# Patient Record
Sex: MALE | Race: BLACK OR AFRICAN AMERICAN | NOT HISPANIC OR LATINO | Employment: FULL TIME | URBAN - METROPOLITAN AREA
[De-identification: names, ages, dates, MRNs, and addresses within clinical notes are randomized per-mention and may not be internally consistent; named-entity substitution may affect disease eponyms.]

---

## 2019-02-24 ENCOUNTER — HOSPITAL ENCOUNTER (EMERGENCY)
Facility: CLINIC | Age: 34
Discharge: HOME | End: 2019-02-25
Attending: EMERGENCY MEDICINE | Admitting: EMERGENCY MEDICINE

## 2019-02-24 DIAGNOSIS — G44.40 DRUG-INDUCED HEADACHE, NOT ELSEWHERE CLASSIFIED, NOT INTRACTABLE: ICD-10-CM

## 2019-02-24 PROCEDURE — 99285 EMERGENCY DEPT VISIT HI MDM: CPT | Mod: Z6 | Performed by: EMERGENCY MEDICINE

## 2019-02-24 PROCEDURE — 99285 EMERGENCY DEPT VISIT HI MDM: CPT | Mod: 25 | Performed by: EMERGENCY MEDICINE

## 2019-02-24 ASSESSMENT — MIFFLIN-ST. JEOR: SCORE: 1813.96

## 2019-02-24 NOTE — ED AVS SNAPSHOT
[facility], Emergency Department  [address]  [facility], [place] [address]  Phone:  [phone]  Fax:  [phone]                                    Jenaro Santos   MRN: [id number]    Department:  [facility], Emergency Department   Date of Visit:  2/24/2019           After Visit Summary Signature Page    I have received my discharge instructions, and my questions have been answered. I have discussed any challenges I see with this plan with the nurse or doctor.    ..........................................................................................................................................  Patient/Patient Representative Signature      ..........................................................................................................................................  Patient Representative Print Name and Relationship to Patient    ..................................................               ................................................  Date                                   Time    ..........................................................................................................................................  Reviewed by Signature/Title    ...................................................              ..............................................  Date                                               Time          22EPIC Rev 08/18

## 2019-02-25 ENCOUNTER — APPOINTMENT (OUTPATIENT)
Dept: CT IMAGING | Facility: CLINIC | Age: 34
End: 2019-02-25
Attending: EMERGENCY MEDICINE

## 2019-02-25 VITALS
RESPIRATION RATE: 16 BRPM | DIASTOLIC BLOOD PRESSURE: 64 MMHG | HEIGHT: 73 IN | WEIGHT: 183 LBS | HEART RATE: 76 BPM | BODY MASS INDEX: 24.25 KG/M2 | TEMPERATURE: 98.1 F | SYSTOLIC BLOOD PRESSURE: 125 MMHG | OXYGEN SATURATION: 98 %

## 2019-02-25 LAB
ANION GAP SERPL CALCULATED.3IONS-SCNC: 4 MMOL/L (ref 3–14)
BASOPHILS # BLD AUTO: 0 10E9/L (ref 0–0.2)
BASOPHILS NFR BLD AUTO: 0.5 %
BUN SERPL-MCNC: 12 MG/DL (ref 7–30)
CALCIUM SERPL-MCNC: 8.5 MG/DL (ref 8.5–10.1)
CHLORIDE SERPL-SCNC: 113 MMOL/L (ref 94–109)
CO2 SERPL-SCNC: 27 MMOL/L (ref 20–32)
CREAT SERPL-MCNC: 0.97 MG/DL (ref 0.66–1.25)
DIFFERENTIAL METHOD BLD: NORMAL
EOSINOPHIL # BLD AUTO: 0.1 10E9/L (ref 0–0.7)
EOSINOPHIL NFR BLD AUTO: 1.9 %
ERYTHROCYTE [DISTWIDTH] IN BLOOD BY AUTOMATED COUNT: 12.2 % (ref 10–15)
GFR SERPL CREATININE-BSD FRML MDRD: >90 ML/MIN/{1.73_M2}
GLUCOSE SERPL-MCNC: 96 MG/DL (ref 70–99)
HCT VFR BLD AUTO: 42.9 % (ref 40–53)
HGB BLD-MCNC: 14.9 G/DL (ref 13.3–17.7)
IMM GRANULOCYTES # BLD: 0 10E9/L (ref 0–0.4)
IMM GRANULOCYTES NFR BLD: 0.3 %
INTERPRETATION ECG - MUSE: NORMAL
LYMPHOCYTES # BLD AUTO: 2.6 10E9/L (ref 0.8–5.3)
LYMPHOCYTES NFR BLD AUTO: 42 %
MCH RBC QN AUTO: 31.1 PG (ref 26.5–33)
MCHC RBC AUTO-ENTMCNC: 34.7 G/DL (ref 31.5–36.5)
MCV RBC AUTO: 90 FL (ref 78–100)
MONOCYTES # BLD AUTO: 0.5 10E9/L (ref 0–1.3)
MONOCYTES NFR BLD AUTO: 7.3 %
NEUTROPHILS # BLD AUTO: 3 10E9/L (ref 1.6–8.3)
NEUTROPHILS NFR BLD AUTO: 48 %
NRBC # BLD AUTO: 0 10*3/UL
NRBC BLD AUTO-RTO: 0 /100
PLATELET # BLD AUTO: 236 10E9/L (ref 150–450)
POTASSIUM SERPL-SCNC: 4 MMOL/L (ref 3.4–5.3)
RBC # BLD AUTO: 4.79 10E12/L (ref 4.4–5.9)
SODIUM SERPL-SCNC: 144 MMOL/L (ref 133–144)
WBC # BLD AUTO: 6.2 10E9/L (ref 4–11)

## 2019-02-25 PROCEDURE — 96375 TX/PRO/DX INJ NEW DRUG ADDON: CPT | Performed by: EMERGENCY MEDICINE

## 2019-02-25 PROCEDURE — 96374 THER/PROPH/DIAG INJ IV PUSH: CPT | Performed by: EMERGENCY MEDICINE

## 2019-02-25 PROCEDURE — 25000125 ZZHC RX 250: Performed by: EMERGENCY MEDICINE

## 2019-02-25 PROCEDURE — 25000128 H RX IP 250 OP 636: Performed by: EMERGENCY MEDICINE

## 2019-02-25 PROCEDURE — 93005 ELECTROCARDIOGRAM TRACING: CPT | Performed by: EMERGENCY MEDICINE

## 2019-02-25 PROCEDURE — 80048 BASIC METABOLIC PNL TOTAL CA: CPT | Performed by: EMERGENCY MEDICINE

## 2019-02-25 PROCEDURE — 85025 COMPLETE CBC W/AUTO DIFF WBC: CPT | Performed by: EMERGENCY MEDICINE

## 2019-02-25 PROCEDURE — 70450 CT HEAD/BRAIN W/O DYE: CPT

## 2019-02-25 PROCEDURE — 96361 HYDRATE IV INFUSION ADD-ON: CPT | Performed by: EMERGENCY MEDICINE

## 2019-02-25 PROCEDURE — 25000132 ZZH RX MED GY IP 250 OP 250 PS 637: Performed by: EMERGENCY MEDICINE

## 2019-02-25 RX ORDER — SODIUM CHLORIDE 9 MG/ML
1000 INJECTION, SOLUTION INTRAVENOUS CONTINUOUS
Status: DISCONTINUED | OUTPATIENT
Start: 2019-02-25 | End: 2019-02-25 | Stop reason: HOSPADM

## 2019-02-25 RX ORDER — KETOROLAC TROMETHAMINE 30 MG/ML
30 INJECTION, SOLUTION INTRAMUSCULAR; INTRAVENOUS ONCE
Status: COMPLETED | OUTPATIENT
Start: 2019-02-25 | End: 2019-02-25

## 2019-02-25 RX ORDER — LIDOCAINE 40 MG/G
CREAM TOPICAL
Status: DISCONTINUED | OUTPATIENT
Start: 2019-02-25 | End: 2019-02-25 | Stop reason: HOSPADM

## 2019-02-25 RX ORDER — ONDANSETRON 2 MG/ML
4 INJECTION INTRAMUSCULAR; INTRAVENOUS EVERY 30 MIN PRN
Status: DISCONTINUED | OUTPATIENT
Start: 2019-02-25 | End: 2019-02-25 | Stop reason: HOSPADM

## 2019-02-25 RX ADMIN — KETOROLAC TROMETHAMINE 30 MG: 30 INJECTION, SOLUTION INTRAMUSCULAR; INTRAVENOUS at 01:08

## 2019-02-25 RX ADMIN — LIDOCAINE HYDROCHLORIDE 30 ML: 20 SOLUTION ORAL; TOPICAL at 01:38

## 2019-02-25 RX ADMIN — SODIUM CHLORIDE 500 ML: 9 INJECTION, SOLUTION INTRAVENOUS at 01:08

## 2019-02-25 RX ADMIN — ONDANSETRON 4 MG: 2 INJECTION INTRAMUSCULAR; INTRAVENOUS at 01:08

## 2019-02-25 SDOH — HEALTH STABILITY: MENTAL HEALTH: HOW OFTEN DO YOU HAVE A DRINK CONTAINING ALCOHOL?: NEVER

## 2019-02-25 ASSESSMENT — ENCOUNTER SYMPTOMS
SLEEP DISTURBANCE: 0
RHINORRHEA: 0
COUGH: 0
SINUS PAIN: 0
DIAPHORESIS: 0
TROUBLE SWALLOWING: 0
SORE THROAT: 0
ARTHRALGIAS: 0
VOMITING: 0
WEAKNESS: 0
SEIZURES: 0
NERVOUS/ANXIOUS: 1
EYE REDNESS: 0
HEADACHES: 1
FATIGUE: 0
TREMORS: 0
COLOR CHANGE: 0
WOUND: 0
SPEECH DIFFICULTY: 0
BRUISES/BLEEDS EASILY: 0
FACIAL SWELLING: 0
DIZZINESS: 0
FEVER: 0
CHEST TIGHTNESS: 0
NECK STIFFNESS: 0
NECK PAIN: 1
CONFUSION: 0
FACIAL ASYMMETRY: 0
NAUSEA: 1
CHILLS: 0
SINUS PRESSURE: 0
LIGHT-HEADEDNESS: 0
NUMBNESS: 0
BLOOD IN STOOL: 0
DIFFICULTY URINATING: 0
PALPITATIONS: 0
CONSTITUTIONAL NEGATIVE: 1
APPETITE CHANGE: 0
SHORTNESS OF BREATH: 0
ABDOMINAL PAIN: 0

## 2019-02-25 NOTE — ED NOTES
Spoke with Cassi at poison control. Update provided. Closing pt's case at this time. Staff to call back if any new concerns arise.

## 2019-02-25 NOTE — ED TRIAGE NOTES
"Pt ingested a male enhancement supplement called \"Kingdom Honey\"  he bought OTC.  Pt stated he does not take any recreational drugs.  Pt reports having a headache that is different than usual and a stiff neck.  Pt searched this supplement on Internet and learned the FDA has a warning against use.  "

## 2019-02-25 NOTE — ED NOTES
"Per physician's request laboratory services called in regards to the patient's lab results from blood which was drawn at 0100. The tests show \"in process.\" When speaking with the staff at the main lab I was informed there is only one person working there and ER specimens are processed in the satellite lab. Upon receiving their number I spoke with the staff in the satellite whom stated they did not have the patient's blood work. Stated they would call the main lab as they are responsible for receiving and making the labs \"in process.\" The satellite lab staff stated, \"I will call you back if there are any problems.\"   "

## 2019-02-25 NOTE — ED NOTES
Followed up with lab regarding missing blood work. Called satellite lab. Stated they were waiting to hear back from main lab. The main lab was contacted. States they are still looking for the blood. Writer told them due to the excessively prolonged delay that she would be sending new specimens. New blood sent to lab.

## 2019-02-25 NOTE — DISCHARGE INSTRUCTIONS
"Avoid use of \"Kingdom Honey\". This substance contains tadalafil (Cialis) and can cause headaches and other side effects.  Follow up with your primary care clinic provider.  Return if persistent symptoms.  Use antacids if needed.  Take Tylenol if needed for pain.  "

## 2019-02-25 NOTE — ED PROVIDER NOTES
"                                                                                                                                                                History     Chief Complaint   Patient presents with     Headache     Pt took OTC male enhancement product last night and he found out the FDA has warning regarding this supplement.     Neck Pain     The history is provided by the patient and medical records.     Jenaor Santos is a 34 year old male who presents to the Emergency Department for evaluation of a headache and neck pain. Patient, who is a , stopped at a store to get oil and while there, saw a man grab a product called \"kingdom honey\". He asked what it was and the man told him that it was good for sexual pleasure. Patient purchased it and took 2 doses of it. He reports that he developed a headache after taking the doses. He checked the labeling and it notes that is has Viagra and Cialis in it. Today, he reports that he took half a dose of \"kingdom honey\" because he liked the feeling of it but continued to have a headache. He also complains of constant heartburn and blurry vision. In addition, he notes of neck pain that worsens when turning his head laterally. He denies a fever. He denies trouble walking or balancing. He denies trouble swallowing. He denies a cough or shortness of breath; however, reports that after having sexual intercourse last night, felt shortness of breath. He reports that he has never taken drugs before. He denies a history of headaches or migraines. He denies a history of HTN or shortness of breath.   No history of trauma. No confusion or other mental status changes. No other known toxin exposure including no known exposure to carbon monoxide. No history of vascular headache. No personal or family history of aneurysm. No chest pain but reports history of GERD and has \"heartburn\" now. No neck stiffness. No fever or chills or other systemic symptoms. No history of " neuralgia or arteritis or glaucoma.  History reviewed. No pertinent past medical history.    History reviewed. No pertinent surgical history.    History reviewed. No pertinent family history.    Social History     Tobacco Use     Smoking status: Current Every Day Smoker     Packs/day: 1.00     Years: 15.00     Pack years: 15.00     Smokeless tobacco: Never Used   Substance Use Topics     Alcohol use: No     Frequency: Never       Current Facility-Administered Medications   Medication     lidocaine (LMX4) cream     lidocaine 1 % 0.1-1 mL     ondansetron (ZOFRAN) injection 4 mg     sodium chloride (PF) 0.9% PF flush 3 mL     sodium chloride (PF) 0.9% PF flush 3 mL     sodium chloride 0.9% infusion     No current outpatient medications on file.      No Known Allergies    I have reviewed the Medications, Allergies, Past Medical and Surgical History, and Social History in the Epic system.    Review of Systems   Constitutional: Negative.  Negative for appetite change, chills, diaphoresis, fatigue and fever.        Negative for trouble walking or balancing   HENT: Negative for congestion, dental problem, ear pain, facial swelling, mouth sores, rhinorrhea, sinus pressure, sinus pain, sore throat and trouble swallowing.    Eyes: Negative for redness and visual disturbance.        Positive for blurry vision   Respiratory: Negative for cough, chest tightness and shortness of breath.    Cardiovascular: Negative for chest pain, palpitations and leg swelling.        Positive for heartburn   Gastrointestinal: Positive for nausea. Negative for abdominal pain, blood in stool and vomiting.   Genitourinary: Negative for difficulty urinating.   Musculoskeletal: Positive for neck pain (When turning head). Negative for arthralgias, gait problem and neck stiffness.   Skin: Negative for color change, rash and wound.   Allergic/Immunologic: Negative for immunocompromised state.   Neurological: Positive for headaches. Negative for dizziness,  "tremors, seizures, syncope, facial asymmetry, speech difficulty, weakness, light-headedness and numbness.   Hematological: Does not bruise/bleed easily.   Psychiatric/Behavioral: Negative for confusion and sleep disturbance. The patient is nervous/anxious.    All other systems reviewed and are negative.      Physical Exam   BP: 110/80  Pulse: 74  Temp: 98.1  F (36.7  C)  Resp: 16  Height: 185.4 cm (6' 1\")  Weight: 83 kg (183 lb)  SpO2: 99 %      Physical Exam   Constitutional: He is oriented to person, place, and time. He appears well-developed and well-nourished. No distress.   HENT:   Head: Normocephalic and atraumatic.   Nose: Nose normal. Right sinus exhibits no maxillary sinus tenderness and no frontal sinus tenderness. Left sinus exhibits no maxillary sinus tenderness and no frontal sinus tenderness.   Mouth/Throat: Uvula is midline, oropharynx is clear and moist and mucous membranes are normal. No oral lesions. No uvula swelling.   No head tenderness. No temporal artery tenderness.   Eyes: Pupils are equal, round, and reactive to light. No scleral icterus.   Neck: Normal range of motion. Neck supple.   No nuchal rigidity. No meningeal signs.   Cardiovascular: Normal heart sounds and intact distal pulses.   Pulmonary/Chest: Breath sounds normal. No respiratory distress.   Abdominal: Soft. Bowel sounds are normal. There is no tenderness.   Musculoskeletal: He exhibits no edema or tenderness.   Neurological: He is alert and oriented to person, place, and time. He has normal strength and normal reflexes. No cranial nerve deficit or sensory deficit. Coordination and gait normal. He displays no Babinski's sign on the right side. He displays no Babinski's sign on the left side.   Skin: Skin is warm. No rash noted. He is not diaphoretic.   Psychiatric: He has a normal mood and affect. His speech is normal and behavior is normal. Cognition and memory are normal.   Nursing note and vitals reviewed.      ED Course "   12:29 AM  The patient was seen and examined by Coy Scruggs MD in Room ED04.        Procedures             Critical Care time:  none             Labs Ordered and Resulted from Time of ED Arrival Up to the Time of Departure from the ED   BASIC METABOLIC PANEL - Abnormal; Notable for the following components:       Result Value    Chloride 113 (*)     All other components within normal limits   CBC WITH PLATELETS DIFFERENTIAL   PERIPHERAL IV CATHETER     Medications   lidocaine 1 % 0.1-1 mL (not administered)   lidocaine (LMX4) cream (not administered)   sodium chloride (PF) 0.9% PF flush 3 mL (not administered)   sodium chloride (PF) 0.9% PF flush 3 mL (0 mLs Intracatheter Hold 2/25/19 0152)   0.9% sodium chloride BOLUS (0 mLs Intravenous Stopped 2/25/19 0150)     Followed by   sodium chloride 0.9% infusion (0 mLs Intravenous Stopped 2/25/19 0406)   ondansetron (ZOFRAN) injection 4 mg (4 mg Intravenous Given 2/25/19 0108)   ketorolac (TORADOL) injection 30 mg (30 mg Intravenous Given 2/25/19 0108)   lidocaine VISCOUS (XYLOCAINE) 2 % 15 mL, alum & mag hydroxide-simethicone (MYLANTA ES/MAALOX  ES) 15 mL GI Cocktail (30 mLs Oral Given 2/25/19 0138)          Assessments & Plan (with Medical Decision Making)   Discussed with poison center. Up to 42% of patients reported to have headaches after taking this substance. Instructed him to avoid use in future. He is much improved with ketorolac and ondansetron and GI cocktail. No evidence of intracranial hemorrhage or acute cardiovascular event. No evidence of infection and no report of trauma. No other history of exposure to toxins. No findings suggestive of meningitis, arteritis, neuralgia, glaucoma or other causes of headache. Follow up with primary care provider and instructed to return if persistent symptoms.     I have reviewed the nursing notes.    I have reviewed the findings, diagnosis, plan and need for follow up with the patient.       Medication List       There are no discharge medications for this visit.         Final diagnoses:   Drug-induced headache, not elsewhere classified, not intractable     I, Clari Suh, am serving as a trained medical scribe to document services personally performed by Coy Scruggs MD, based on the provider's statements to me.      Coy AMADOR MD, was physically present and have reviewed and verified the accuracy of this note documented by Clari Suh.     2/24/2019   [facility], [place], EMERGENCY DEPARTMENT     Coy Scruggs MD  02/25/19 0424

## 2019-09-24 ENCOUNTER — OFFICE (OUTPATIENT)
Dept: URBAN - METROPOLITAN AREA CLINIC 100 | Facility: CLINIC | Age: 34
Setting detail: OPHTHALMOLOGY
End: 2019-09-24
Payer: MEDICAID

## 2019-09-24 DIAGNOSIS — S05.02XA: ICD-10-CM

## 2019-09-24 DIAGNOSIS — T15.01XA: ICD-10-CM

## 2019-09-24 PROCEDURE — 92002 INTRM OPH EXAM NEW PATIENT: CPT | Performed by: OPTOMETRIST

## 2019-09-24 PROCEDURE — 65222 REMOVE FOREIGN BODY FROM EYE: CPT | Performed by: OPTOMETRIST

## 2019-09-24 ASSESSMENT — REFRACTION_CURRENTRX
OD_OVR_VA: 20/
OS_OVR_VA: 20/
OD_OVR_VA: 20/
OS_OVR_VA: 20/
OS_OVR_VA: 20/
OD_OVR_VA: 20/

## 2019-09-24 ASSESSMENT — VISUAL ACUITY
OS_BCVA: 20/30-2
OD_BCVA: 20/30-2

## 2019-09-24 ASSESSMENT — REFRACTION_MANIFEST
OU_VA: 20/
OU_VA: 20/
OS_VA2: 20/
OS_VA3: 20/
OS_VA1: 20/
OD_VA1: 20/
OD_VA3: 20/
OD_VA1: 20/
OS_VA2: 20/
OS_VA3: 20/
OD_VA2: 20/
OD_VA2: 20/
OS_VA1: 20/
OD_VA3: 20/

## 2019-09-24 ASSESSMENT — CONFRONTATIONAL VISUAL FIELD TEST (CVF)
OD_FINDINGS: FULL
OS_FINDINGS: FULL

## 2019-09-24 ASSESSMENT — CORNEAL TRAUMA - ABRASION: OD_ABRASION: PRESENT

## 2019-09-24 ASSESSMENT — CORNEAL TRAUMA - FOREIGN BODY: OD_FOREIGNBODY: METALLIC W/RUST RUN PRESENT

## 2022-12-03 ENCOUNTER — HOSPITAL ENCOUNTER (EMERGENCY)
Facility: CLINIC | Age: 37
Discharge: HOME | End: 2022-12-03
Attending: EMERGENCY MEDICINE | Admitting: EMERGENCY MEDICINE

## 2022-12-03 VITALS
OXYGEN SATURATION: 99 % | HEIGHT: 73 IN | WEIGHT: 187 LBS | HEART RATE: 87 BPM | DIASTOLIC BLOOD PRESSURE: 74 MMHG | BODY MASS INDEX: 24.78 KG/M2 | TEMPERATURE: 98.3 F | RESPIRATION RATE: 18 BRPM | SYSTOLIC BLOOD PRESSURE: 133 MMHG

## 2022-12-03 DIAGNOSIS — L05.01 PILONIDAL ABSCESS: ICD-10-CM

## 2022-12-03 PROCEDURE — 99283 EMERGENCY DEPT VISIT LOW MDM: CPT | Mod: 25

## 2022-12-03 PROCEDURE — 10080 I&D PILONIDAL CYST SIMPLE: CPT

## 2022-12-03 PROCEDURE — 87077 CULTURE AEROBIC IDENTIFY: CPT | Performed by: EMERGENCY MEDICINE

## 2022-12-03 PROCEDURE — 87070 CULTURE OTHR SPECIMN AEROBIC: CPT | Performed by: EMERGENCY MEDICINE

## 2022-12-03 RX ORDER — CEPHALEXIN 500 MG/1
500 CAPSULE ORAL 3 TIMES DAILY
Qty: 21 CAPSULE | Refills: 0 | Status: SHIPPED | OUTPATIENT
Start: 2022-12-03 | End: 2022-12-10

## 2022-12-03 RX ORDER — LIDOCAINE HYDROCHLORIDE AND EPINEPHRINE 10; 10 MG/ML; UG/ML
INJECTION, SOLUTION INFILTRATION; PERINEURAL
Status: DISCONTINUED
Start: 2022-12-03 | End: 2022-12-03 | Stop reason: HOSPADM

## 2022-12-03 ASSESSMENT — ENCOUNTER SYMPTOMS
FEVER: 0
MYALGIAS: 1

## 2022-12-03 NOTE — ED PROVIDER NOTES
"  History   Chief Complaint:  Wound Check       The history is provided by the patient.      Jenaro Santos is a 37 year old otherwise healthy male who presents for wound check. Patient reports that he developed, what seems like, an abscess on his buttocks 3 days ago. He endorses pain, sensitivity and body aches. He denies fever. He states this is the fist time something like this has happened. He reports that he was sent here from Urgent Care to have the abscess drained.      Review of Systems   Constitutional: Negative for fever.   Musculoskeletal: Positive for myalgias.   All other systems reviewed and are negative.      Allergies:  No Known Allergies    Medications:  No current outpatient medications on file.    Past Medical History:     There is no problem list on file for this patient.     Past Surgical History:    No past surgical history on file.     Family History:    No family history on file.    Social History:  Presents with female abscess   Presents via private vehicle   PCP: No Ref-Primary, Physician     Physical Exam     Patient Vitals for the past 24 hrs:   BP Temp Temp src Pulse Resp SpO2 Height Weight   12/03/22 1043 133/74 98.3  F (36.8  C) Temporal 87 18 99 % 1.854 m (6' 1\") 84.8 kg (187 lb)       Physical Exam  Vitals reviewed.   Cardiovascular:      Rate and Rhythm: Normal rate.   Pulmonary:      Effort: Pulmonary effort is normal.   Abdominal:      General: Abdomen is flat.      Palpations: Abdomen is soft.   Genitourinary:     Comments: There is a midline cyst just above the gluteal cleft consistent with pilonidal cyst that is red and indurated.  Patient is tender with palpation.  It is clearly fluctuant.  Skin:     General: Skin is warm.      Capillary Refill: Capillary refill takes less than 2 seconds.   Neurological:      Mental Status: He is alert.         Emergency Department Course   Laboratory:  Labs Ordered and Resulted from Time of ED Arrival to Time of ED Departure - No data to " display     Procedures    Incision and Drainage     Procedure: Incision and Drainage     Consent: Verbal    Indication: Cyst, pylonidal    Location: Anogenital    Size: 3 cm      Preparation: Alcohol and Povidone-iodine     Anesthesia/Sedation: Lidocaine with Epinephrine - 1%     Procedure Detail:    Aspiration: Yes  Incision Type: Single straight  Scalpel: 11  Lesion Management: Probed and deloculated   Wound Management: Packing   Packing: Gauze, 1/4 inch     Patient Status: The patient tolerated the procedure well: Yes. There were no complications.    Emergency Department Course:     Reviewed:  I reviewed nursing notes, vitals, past medical history and Care Everywhere    Assessments:  1342 I obtained history and examined the patient as noted above.   1357 I rechecked the patient and performed I&D.     Disposition:  The patient was discharged to home.     Impression & Plan   Medical Decision Making:  Patient presents with induration and pain around the pilonidal region and clearly has a pilonidal cyst this is new and not recurrent.  Care was discussed with the patient and verbal consent was obtained and patient had incision and drainage performed by me.  Fluid was sent for culture.  Ultimately recommended antibiotics due to the extent of erythema and induration around the abscess.  Patient will recommend packing removal in 72 hours and was discharged in improved and stable condition.    Diagnosis:    ICD-10-CM    1. Pilonidal abscess  L05.01           Discharge Medications:  New Prescriptions    CEPHALEXIN (KEFLEX) 500 MG CAPSULE    Take 1 capsule (500 mg) by mouth 3 times daily for 7 days       Scribe Disclosure:  PERRY Anahi Bobby, am serving as a scribe at 1:37 PM on 12/3/2022 to document services personally performed by Duc Doyle MD based on my observations and the provider's statements to me.            Duc Doyle MD  12/04/22 0221

## 2022-12-03 NOTE — ED TRIAGE NOTES
ABCs intact. Pt c/o rectal abscess. Pt went to urgent care and was sent here to have it drained.

## 2022-12-03 NOTE — DISCHARGE INSTRUCTIONS
We have placed packing in the abscess.  Please have this removed either in the surgery clinic or in the emergency room in 3 days.  Complete course of antibiotics we have done a wound culture if you need a change we will call you.  Thanks for your patience today.

## 2022-12-06 ENCOUNTER — TELEPHONE (OUTPATIENT)
Dept: EMERGENCY MEDICINE | Facility: CLINIC | Age: 37
End: 2022-12-06

## 2022-12-06 NOTE — TELEPHONE ENCOUNTER
[facility] Emergency Department Lab result notification    [place] ED lab result protocol used  Culture    Reason for call  Notify of lab results, assess symptoms,  review ED providers recommendations/discharge instructions (if necessary) and advise per ED lab result f/u protocol    Lab Result (including Rx patient on, if applicable)  Preliminary abscess Aerobic Bacterial Culture report on (specimen - pilonidal cyst) shows the presence of bacteria(s):  multiple bacteria including Isolated in broth only Clostridium perfringens   [facility] Emergency Dept discharge antibiotic prescribed: Cephalexin (Keflex) 500 mg capsule, 1 capsule (500 mg) by mouth 3 times daily for 7 days.   Incision and Drainage performed in Emergency Dept [Yes or No]: yes  Recommendations in treatment per [facility] ED lab result Culture protocol    Information table from Emergency Dept Provider visit on 12/3/22  Symptoms reported at ED visit (Chief complaint, HPI) Wound Check        The history is provided by the patient.      Jenaro Santos is a 37 year old otherwise healthy male who presents for wound check. Patient reports that he developed, what seems like, an abscess on his buttocks 3 days ago. He endorses pain, sensitivity and body aches. He denies fever. He states this is the fist time something like this has happened. He reports that he was sent here from Urgent Care to have the abscess drained.    Significant Medical hx, if applicable (i.e. CKD, diabetes) None   Allergies No Known Allergies   Weight, if applicable Wt Readings from Last 2 Encounters:   12/03/22 84.8 kg (187 lb)   02/24/19 83 kg (183 lb)      Coumadin/Warfarin [Yes /No] No   Creatinine Level (mg/dl) Creatinine   Date Value Ref Range Status   02/25/2019 0.97 0.66 - 1.25 mg/dL Final      Creatinine clearance (ml/min), if applicable Creatinine clearance cannot be calculated (Patient's most recent lab result is older than the maximum 30 days  "allowed.)   ED providers Impression and Plan (applicable information) Patient presents with induration and pain around the pilonidal region and clearly has a pilonidal cyst this is new and not recurrent.  Care was discussed with the patient and verbal consent was obtained and patient had incision and drainage performed by me.  Fluid was sent for culture.  Ultimately recommended antibiotics due to the extent of erythema and induration around the abscess.  Patient will recommend packing removal in 72 hours and was discharged in improved and stable condition.   ED diagnosis  ICD-10-CM     1. Pilonidal abscess          ED provider Duc Doyle MD     1:56PM: Call received from Ann-Marie in [facility] with a significant result, Clostridium perfringens in the preliminary aerobic culture.     RN Assessment (Patient s current Symptoms), include time called.  [Insert Left message here if message left]  2:20PM: Patient states he is doing \"a lot better. I can actually drive today.\"   States that the packing came out on the second day.   The area when he looked yesterday was \"alittle red\", has not looked at the area today.   Denies any other symptoms, no fever, abdominal pain, back pain, chest pain.   The swelling in the abscess site is gone and there is no pain.   Is taking the antibiotic.   When asked if he has a follow up visit scheduled he states no and that he wasn't planning to see anyone.     RN Recommendations/Instructions per [facility] ED lab result protocol  Patient notified of preliminary lab result and treatment recommendations.  RN reviewed information about the preliminary culture result.   Advised that I would consult with the ED provider and call him back.      Consultation Time:  2:33PM  Consulted with [facility] Emergency Department Provider, Dr. Akin De La Fuente. Reviewed patient's history, current symptoms and preliminary culture result.   Emergency Dept Provider Recommendations:  No change in " treatment.     Karishma Enciso RN  [facility]  Emergency Dept Lab Result RN  # [phone]     Copy of Lab result   Abscess Aerobic Bacterial Culture Routine  Order: [id number]   Collected 12/3/2022  2:17 PM      Status: Preliminary result      Visible to patient: No (not released)     Specimen Information: Pilonidal Cyst; Abscess    4 Result Notes  Culture Culture in progress       3+ Klebsiella pneumoniae Abnormal       This isolate of Klebsiella pneumoniae demonstrates a Hypermucoviscous phenotype (hmKp), Hypermucoviscous Klebsiella pneumoniae (hmKp) may reflect a hypervirulent strain.   2+ Salmonella group Abnormal        2+ Escherichia coli Abnormal        1+ Streptococcus anginosus Abnormal     This organism is susceptible to ampicillin, penicillin, vancomycin and the cephalosporins. If treatment is required and your patient is allergic to penicillin, contact the microbiology lab within 5 days to request susceptibility testing.   1+ Streptococcus constellatus Abnormal     This organism is susceptible to ampicillin, penicillin, vancomycin and the cephalosporins. If treatment is required and your patient is allergic to penicillin, contact the microbiology lab within 5 days to request susceptibility testing.   Isolated in broth only Clostridium perfringens Abnormal     On day 1 of incubation   Isolated in broth only Bacteroides fragilis Abnormal     On day 1 of incubation        Resulting Agency: [facility]     Susceptibility     Klebsiella pneumoniae Escherichia coli     HALLIE HALLIE     Ampicillin >=32 ug/mL Resistant 1 >=32 ug/mL Resistant     Ampicillin/ Sulbactam 4 ug/mL Susceptible 4 ug/mL Susceptible     Cefepime <=1 ug/mL Susceptible <=1 ug/mL Susceptible     Ceftazidime <=1 ug/mL Susceptible <=1 ug/mL Susceptible     Ceftriaxone <=1 ug/mL Susceptible <=1 ug/mL Susceptible     Ciprofloxacin <=0.25 ug/mL Susceptible <=0.25 ug/mL Susceptible     Gentamicin <=1 ug/mL Susceptible <=1  ug/mL Susceptible     Levofloxacin <=0.12 ug/mL Susceptible <=0.12 ug/mL Susceptible     Meropenem <=0.25 ug/mL Susceptible <=0.25 ug/mL Susceptible     Piperacillin/Tazobactam <=4 ug/mL Susceptible <=4 ug/mL Susceptible     Tobramycin <=1 ug/mL Susceptible <=1 ug/mL Susceptible     Trimethoprim/Sulfamethoxazole <=1/19 ug/mL Susceptible >16/304 ug/mL Resistant                1 Intrinsically Resistant            Specimen Collected: 12/03/22  2:17 PM Last Resulted: 12/06/22  1:47 PM

## 2022-12-06 NOTE — TELEPHONE ENCOUNTER
RN Recommendations/Instructions per [place] ED provider  2:44PM and 2:58PM: Unable to leave voicemail message requesting a call back to [facility] ED Lab Result RN at [phone].  RN is available every day between 9 a.m. and 5:30 p.m.    Need to review with patient the ED provider's recommendation.     Karishma Enciso RN  [facility]  Emergency Dept Lab Result RN  Ph# [phone]

## 2022-12-08 NOTE — RESULT ENCOUNTER NOTE
Final Abscess Aerobic Bacterial Culture (specimen - pilonidal cyst) report on 12/8/22  [facility] Emergency Dept discharge antibiotic prescribed: Cephalexin (Keflex) 500 mg capsule, 1 capsule (500 mg) by mouth 3 times daily for 7 days.   Several bacteria in culture:  Klebsiella pneumoniae, Salmonella group, E coli, Staph Anginosus, Strep Constellatus, Clostridium perfringes (in both only) and Bacteroides fragils (in both only)  Incision and Drainage performed in the [place] ED: Yes  Recommendations in treatment per [facility] ED lab result culture protocol

## 2022-12-08 NOTE — RESULT ENCOUNTER NOTE
At 10:37A, Mailbox is full and RN is unable to leave a  voicemail message requesting a call back to [facility] ED Lab Result RN at [phone].  RN is available every day between 9 a.m. and 5:30 p.m.  See Telephone encounter.

## 2022-12-08 NOTE — TELEPHONE ENCOUNTER
[facility] Emergency Department/Urgent Care Lab result notification:    [place] ED lab result protocol used  culture    Reason for call  Notify of lab results, assess symptoms,  review ED providers recommendations/discharge instructions (if necessary) and advise per ED lab result f/u protocol    Lab Result   Final Abscess Aerobic Bacterial Culture (specimen - pilonidal cyst) report on 12/8/22  [facility] Emergency Dept discharge antibiotic prescribed: Cephalexin (Keflex) 500 mg capsule, 1 capsule (500 mg) by mouth 3 times daily for 7 days.   Several bacteria in culture:  Klebsiella pneumoniae, Salmonella group, E coli, Staph Anginosus, Strep Constellatus, Clostridium perfringes (in both only) and Bacteroides fragils (in both only)  Incision and Drainage performed in the [place] ED: Yes  Recommendations in treatment per [facility] ED lab result culture protocol    Information table from Emergency Dept Provider visit on 12/3/22  Symptoms reported at ED visit (Chief complaint, HPI) Chief Complaint: Wound Check     The history is provided by the patient.    Jenaro Santos is a 37 year old otherwise healthy male who presents for wound check. Patient reports that he developed, what seems like, an abscess on his buttocks 3 days ago. He endorses pain, sensitivity and body aches. He denies fever. He states this is the fist time something like this has happened. He reports that he was sent here from Urgent Care to have the abscess drained.        ED providers Impression and Plan (applicable information) Medical Decision Making:  Patient presents with induration and pain around the pilonidal region and clearly has a pilonidal cyst this is new and not recurrent.  Care was discussed with the patient and verbal consent was obtained and patient had incision and drainage performed by me.  Fluid was sent for culture.  Ultimately recommended antibiotics due to the extent of erythema and induration around  the abscess.  Patient will recommend packing removal in 72 hours and was discharged in improved and stable condition.   Miscellaneous information NA     RN Recommendations/Instructions per [place] ED lab result protocol  At 10:37A, Mailbox is full and RN is unable to leave a  voicemail message requesting a call back to [facility] ED Lab Result RN at [phone].  RN is available every day between 9 a.m. and 5:30 p.m.    Lance Cabrera RN  [facility]  Emergency Dept Lab Result RN  Ph# [phone]     Copy of Lab result

## 2022-12-09 NOTE — TELEPHONE ENCOUNTER
"[facility] Emergency Department/Urgent Care Lab result notification     Patient/parent Name  Jenaro    RN Assessment (Patient s current Symptoms), include time called.  [Insert Left message here if message left]  \"It is about 95% better.  It is so so much better.\"    Lab result (if applicable):  Final Abscess Aerobic Bacterial Culture (specimen - pilonidal cyst) report on 12/8/22  [facility] Emergency Dept discharge antibiotic prescribed: Cephalexin (Keflex) 500 mg capsule, 1 capsule (500 mg) by mouth 3 times daily for 7 days.   Several bacteria in culture:  Klebsiella pneumoniae, Salmonella group, E coli, Staph Anginosus, Strep Constellatus, Clostridium perfringes (in both only) and Bacteroides fragils (in both only)  Incision and Drainage performed in the [place] ED: Yes  Recommendations in treatment per [facility] ED lab result culture protocol    RN Recommendations/Instructions per [place] ED lab result protocol  Patient notified of lab result and treatment recommendations.   Reviewed recommendations from ED Provider to f/u with PCP or surgeon.     Please Contact your PCP clinic or return to the Emergency department if your:    Symptoms return.    Symptoms worsen or other concerning symptom's.    PCP follow-up Questions asked: YES       Lance Cabrera RN  [facility]  Emergency Dept Lab Result RN  Ph# [phone]     "

## 2022-12-14 LAB
BACTERIA ABSC ANAEROBE+AEROBE CULT: ABNORMAL

## 2024-11-11 ENCOUNTER — OFFICE VISIT (OUTPATIENT)
Dept: URGENT CARE | Facility: URGENT CARE | Age: 39
End: 2024-11-11
Payer: COMMERCIAL

## 2024-11-11 VITALS
OXYGEN SATURATION: 99 % | DIASTOLIC BLOOD PRESSURE: 66 MMHG | BODY MASS INDEX: 26.39 KG/M2 | WEIGHT: 200 LBS | SYSTOLIC BLOOD PRESSURE: 114 MMHG | TEMPERATURE: 98 F | HEART RATE: 74 BPM

## 2024-11-11 DIAGNOSIS — L73.9 FOLLICULITIS: Primary | ICD-10-CM

## 2024-11-11 PROCEDURE — 99203 OFFICE O/P NEW LOW 30 MIN: CPT | Performed by: FAMILY MEDICINE

## 2024-11-11 RX ORDER — CEPHALEXIN 500 MG/1
500 CAPSULE ORAL 4 TIMES DAILY
Qty: 40 CAPSULE | Refills: 0 | Status: SHIPPED | OUTPATIENT
Start: 2024-11-11 | End: 2024-11-11

## 2024-11-11 RX ORDER — CEPHALEXIN 500 MG/1
500 CAPSULE ORAL 4 TIMES DAILY
Qty: 40 CAPSULE | Refills: 0 | Status: SHIPPED | OUTPATIENT
Start: 2024-11-11 | End: 2024-11-21

## 2024-11-11 NOTE — PROGRESS NOTES
Jenaro Santos is a 39 year old male who presents to the clinic today concerned about a mass located scalp.    It has been present for 2 week(s).    Symptoms include tenderness, drainage, and swelling.    No past medical history on file.    No past surgical history on file.    No family history on file.    Social History     Tobacco Use    Smoking status: Every Day     Current packs/day: 1.00     Average packs/day: 1 pack/day for 15.0 years (15.0 ttl pk-yrs)     Types: Cigarettes    Smokeless tobacco: Never   Substance Use Topics    Alcohol use: No      No efvers    OBJECTIVE:    Blood pressure 114/66, pulse 74, temperature 98  F (36.7  C), temperature source Tympanic, weight 90.7 kg (200 lb), SpO2 99%.    Exam:  tender and 1 cm  x  1 cm mass is seen located scalp.    ASSESSMENT:      ICD-10-CM    1. Folliculitis  L73.9 cephALEXin (KEFLEX) 500 MG capsule        PLAN:  Warm packs and soaks recommended.  Monitor for drainage.  Follow up in 1-2 weeks if not improving.

## 2024-11-20 ENCOUNTER — HOSPITAL ENCOUNTER (EMERGENCY)
Facility: CLINIC | Age: 39
Discharge: HOME | End: 2024-11-20
Attending: EMERGENCY MEDICINE | Admitting: EMERGENCY MEDICINE

## 2024-11-20 VITALS
OXYGEN SATURATION: 97 % | SYSTOLIC BLOOD PRESSURE: 134 MMHG | HEIGHT: 73 IN | WEIGHT: 194 LBS | TEMPERATURE: 97.7 F | BODY MASS INDEX: 25.71 KG/M2 | HEART RATE: 73 BPM | RESPIRATION RATE: 16 BRPM | DIASTOLIC BLOOD PRESSURE: 79 MMHG

## 2024-11-20 DIAGNOSIS — S80.01XA CONTUSION OF RIGHT KNEE, INITIAL ENCOUNTER: ICD-10-CM

## 2024-11-20 DIAGNOSIS — S83.91XA SPRAIN OF RIGHT KNEE, UNSPECIFIED LIGAMENT, INITIAL ENCOUNTER: ICD-10-CM

## 2024-11-20 PROCEDURE — 99283 EMERGENCY DEPT VISIT LOW MDM: CPT

## 2024-11-20 PROCEDURE — 250N000013 HC RX MED GY IP 250 OP 250 PS 637: Performed by: EMERGENCY MEDICINE

## 2024-11-20 RX ORDER — IBUPROFEN 600 MG/1
600 TABLET, FILM COATED ORAL ONCE
Status: COMPLETED | OUTPATIENT
Start: 2024-11-20 | End: 2024-11-20

## 2024-11-20 RX ADMIN — IBUPROFEN 600 MG: 600 TABLET ORAL at 09:11

## 2024-11-20 ASSESSMENT — COLUMBIA-SUICIDE SEVERITY RATING SCALE - C-SSRS
2. HAVE YOU ACTUALLY HAD ANY THOUGHTS OF KILLING YOURSELF IN THE PAST MONTH?: NO
1. IN THE PAST MONTH, HAVE YOU WISHED YOU WERE DEAD OR WISHED YOU COULD GO TO SLEEP AND NOT WAKE UP?: NO
6. HAVE YOU EVER DONE ANYTHING, STARTED TO DO ANYTHING, OR PREPARED TO DO ANYTHING TO END YOUR LIFE?: NO

## 2024-11-20 ASSESSMENT — ACTIVITIES OF DAILY LIVING (ADL): ADLS_ACUITY_SCORE: 0

## 2024-11-20 NOTE — DISCHARGE INSTRUCTIONS
Knee immobilizer and crutches with weightbearing as tolerated.  Ice and elevate.  Follow-up with primary care or [facility] orthopedics for repeat examination in 3 to 5 days.  May need MRI as an outpatient if symptoms fail to improve over that period of time.

## 2024-11-20 NOTE — ED PROVIDER NOTES
"  Emergency Department Note      History of Present Illness     Chief Complaint   Knee Injury      HPI   Jenaro Santos is a 39 year old male presents to the emergency department with right knee pain.  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  Patient's been ambulatory since that time but has pain with walking.  In addition he took ibuprofen last night.  No swelling that he noted.  Patient denies other injuries.  Not on anticoagulation.  No fall or head injury.    Independent Historian   None    Review of External Notes   None    Past Medical History     Medical History and Problem List   No past medical history on file.    Medications   cephALEXin (KEFLEX) 500 MG capsule        Surgical History   No past surgical history on file.    Physical Exam     Patient Vitals for the past 24 hrs:   BP Temp Temp src Pulse Resp SpO2 Height Weight   11/20/24 0821 134/79 97.7  F (36.5  C) Oral 73 16 97 % 1.854 m (6' 1\") 88 kg (194 lb)     Physical Exam  General: Patient is alert and normal appearing.  HEENT: Head atraumatic    Eyes: pupils equal and reactive. Conjunctiva clear   Nares: patent   Oropharynx: no lesions, uvula midline, no palatal draping, normal voice, no trismus  Neck: Supple without lymphadenopathy, no meningismus  Chest: Heart regular rate and rhythm.   Lungs: Equal clear to auscultation with no wheeze or rales  Abdomen: Soft, non tender, nondistended, normal bowel sounds  Back: No costovertebral angle tenderness, no midline C, T or L spine tenderness  Neuro: Grossly nonfocal, normal speech, strength equal bilaterally, CN 2-12 intact  Extremities: No deformities, equal radial and DP pulses. No clubbing, cyanosis.  No edema  Right knee: No erythema or warmth.  No swelling noted.  Full range of motion.  Able to bear weight.  Foot is warm and well-perfused.  Compartments are soft  Skin: Warm and dry with no rash. "       Diagnostics     Lab Results   Labs Ordered and Resulted from Time of ED Arrival to Time of ED Departure - No data to display    Imaging   XR Knee Right 3 Views   Final Result   IMPRESSION: Chronic enthesitis superior pole of the patella. No   evidence or fracture or significant overall compartment on narrowing.   There is slight lucency along the medial femoral condyle which could   represent a focus of osteochondritis dissecans or osteochondral lesion   but the articular margin appears intact and smooth.      ABIGAIL WALSH MD            SYSTEM ID:  [id number]              Independent Interpretation   Right knee x-ray with no fracture or dislocation    ED Course      Medications Administered   Medications   ibuprofen (ADVIL/MOTRIN) tablet 600 mg (600 mg Oral $Given 11/20/24 0911)       Procedures   Procedures     Discussion of Management   None    ED Course    0855 patient seen and examined by me  0930 patient updated on the findings and the plan    Additional Documentation  None    Medical Decision Making / Diagnosis     CMS Diagnoses: None    MIPS       None    [facility]   Jenaro Santos is a 39 year old male  who presents for evaluation of the right knee.  His exam findings are noted above, most pertinent is no redness, warmth to knee making septic arthritis and/or crystal disease of joint very unlikely.  Signs and symptoms are consistent with a knee sprain and contusion.  A broad differential was also considered including sprain, strain, fracture, tendon rupture, nerve impingement/compromise, referred pain. Supportive outpatient management is indicated.  Rest, ice, and elevation treatment was discussed with the patient. The patients head to toe trauma exam is otherwise negative for serious underlying disease of the head, neck, chest, abdomen, extremities, pelvis.  Close follow-up with patient's primary care physician per discharge precautions. Contusion discharge instructions given for home.  Patient will be  given crutches with weightbearing as tolerated.  Knee immobilizer.  Encouraged with rice ice and elevation.  Follow-up with orthopedics.  MRI as an outpatient if symptoms fail to improve over the next week.    Disposition   The patient was discharged.     Diagnosis     ICD-10-CM    1. Contusion of right knee, initial encounter  S80.01XA       2. Sprain of right knee, unspecified ligament, initial encounter  S83.91XA            Discharge Medications   New Prescriptions    No medications on file         MD Harsh Leon, Ronit Santos MD  11/20/24 0932

## 2024-11-20 NOTE — ED TRIAGE NOTES
Patient injured his knee at work yesterday and is here today for an evaluation of a right knee injury. Patient ambulates without difficulty.      Triage Assessment (Adult)       Row Name 11/20/24 0819          Triage Assessment    Airway WDL WDL        Respiratory WDL    Respiratory WDL WDL        Skin Circulation/Temperature WDL    Skin Circulation/Temperature WDL WDL        Cardiac WDL    Cardiac WDL WDL        Peripheral/Neurovascular WDL    Peripheral Neurovascular WDL WDL        Cognitive/Neuro/Behavioral WDL    Cognitive/Neuro/Behavioral WDL WDL